# Patient Record
Sex: FEMALE | Race: WHITE | NOT HISPANIC OR LATINO | Employment: OTHER | URBAN - METROPOLITAN AREA
[De-identification: names, ages, dates, MRNs, and addresses within clinical notes are randomized per-mention and may not be internally consistent; named-entity substitution may affect disease eponyms.]

---

## 2024-03-19 ENCOUNTER — TELEPHONE (OUTPATIENT)
Dept: PODIATRY | Facility: CLINIC | Age: 74
End: 2024-03-19
Payer: MEDICARE

## 2024-03-19 NOTE — TELEPHONE ENCOUNTER
I spoke to the patient after receiving a referral from Dr.Irene Elliott patient said that she will call back tomorrow to schedule 03/19/2024 [facility] MEDINA.

## 2024-04-04 ENCOUNTER — TELEPHONE (OUTPATIENT)
Dept: PODIATRY | Facility: CLINIC | Age: 74
End: 2024-04-04
Payer: MEDICARE

## 2024-04-04 NOTE — TELEPHONE ENCOUNTER
----- Message from Lydia Witt sent at 4/4/2024 10:41 AM CDT -----  Contact: PT  Type:  Patient Returning Call    Who Called:  PT  Who Left Message for Patient:  Melody  Does the patient know what this is regarding?:  yes, schedule appt for infected ingrown toe nail  Best Call Back Number:  [phone]  Additional Information:    Pt asking  to be seen today or tomorrow

## 2024-04-05 ENCOUNTER — TELEPHONE (OUTPATIENT)
Dept: PODIATRY | Facility: CLINIC | Age: 74
End: 2024-04-05
Payer: MEDICARE

## 2024-04-05 NOTE — TELEPHONE ENCOUNTER
----- Message from Evgeny Wilson sent at 4/5/2024  2:40 PM CDT -----  Regarding: appt  Type:  Sooner Apoointment Request      Name of Caller:pt    When is the first available appointment?dept booked     Symptoms:ingrown toe nail/ infected       Best Call Back Number:[phone]      Additional Information: pt wants to get her appt back that was schedule for Monday.  please call to discuss.

## 2024-04-08 ENCOUNTER — OFFICE VISIT (OUTPATIENT)
Dept: PODIATRY | Facility: CLINIC | Age: 74
End: 2024-04-08
Payer: MEDICARE

## 2024-04-08 VITALS
HEART RATE: 75 BPM | RESPIRATION RATE: 18 BRPM | DIASTOLIC BLOOD PRESSURE: 52 MMHG | SYSTOLIC BLOOD PRESSURE: 140 MMHG | HEIGHT: 68 IN | WEIGHT: 185 LBS | BODY MASS INDEX: 28.04 KG/M2

## 2024-04-08 DIAGNOSIS — L53.9 ERYTHEMA OF SKIN: ICD-10-CM

## 2024-04-08 DIAGNOSIS — L60.1 ONYCHOLYSIS OF TOENAIL: ICD-10-CM

## 2024-04-08 DIAGNOSIS — S99.921S INJURY OF TOENAIL OF RIGHT FOOT, SEQUELA: Primary | ICD-10-CM

## 2024-04-08 DIAGNOSIS — B35.1 ONYCHOMYCOSIS OF TOENAIL: ICD-10-CM

## 2024-04-08 PROCEDURE — 3008F BODY MASS INDEX DOCD: CPT | Mod: CPTII,S$GLB,, | Performed by: PODIATRIST

## 2024-04-08 PROCEDURE — 4010F ACE/ARB THERAPY RXD/TAKEN: CPT | Mod: CPTII,S$GLB,, | Performed by: PODIATRIST

## 2024-04-08 PROCEDURE — 1160F RVW MEDS BY RX/DR IN RCRD: CPT | Mod: CPTII,S$GLB,, | Performed by: PODIATRIST

## 2024-04-08 PROCEDURE — 3288F FALL RISK ASSESSMENT DOCD: CPT | Mod: CPTII,S$GLB,, | Performed by: PODIATRIST

## 2024-04-08 PROCEDURE — 1100F PTFALLS ASSESS-DOCD GE2>/YR: CPT | Mod: CPTII,S$GLB,, | Performed by: PODIATRIST

## 2024-04-08 PROCEDURE — 3077F SYST BP >= 140 MM HG: CPT | Mod: CPTII,S$GLB,, | Performed by: PODIATRIST

## 2024-04-08 PROCEDURE — 3078F DIAST BP <80 MM HG: CPT | Mod: CPTII,S$GLB,, | Performed by: PODIATRIST

## 2024-04-08 PROCEDURE — 99999 PR PBB SHADOW E&M-EST. PATIENT-LVL IV: CPT | Mod: PBBFAC,,, | Performed by: PODIATRIST

## 2024-04-08 PROCEDURE — 1159F MED LIST DOCD IN RCRD: CPT | Mod: CPTII,S$GLB,, | Performed by: PODIATRIST

## 2024-04-08 PROCEDURE — 1126F AMNT PAIN NOTED NONE PRSNT: CPT | Mod: CPTII,S$GLB,, | Performed by: PODIATRIST

## 2024-04-08 PROCEDURE — 99202 OFFICE O/P NEW SF 15 MIN: CPT | Mod: S$GLB,,, | Performed by: PODIATRIST

## 2024-04-08 RX ORDER — LISINOPRIL AND HYDROCHLOROTHIAZIDE 20; 25 MG/1; MG/1
1 TABLET ORAL
COMMUNITY
Start: 2024-02-24

## 2024-04-08 RX ORDER — CIPROFLOXACIN 500 MG/1
500 TABLET ORAL EVERY 12 HOURS
Qty: 14 TABLET | Refills: 0 | Status: SHIPPED | OUTPATIENT
Start: 2024-04-08 | End: 2024-04-15

## 2024-04-08 RX ORDER — SIMVASTATIN 5 MG/1
5 TABLET, FILM COATED ORAL NIGHTLY
COMMUNITY
Start: 2024-01-24

## 2024-04-08 RX ORDER — SIMVASTATIN 10 MG/1
10 TABLET, FILM COATED ORAL
COMMUNITY
Start: 2023-09-20 | End: 2024-04-08

## 2024-04-08 RX ORDER — TRAZODONE HYDROCHLORIDE 50 MG/1
50 TABLET ORAL NIGHTLY PRN
COMMUNITY
Start: 2024-03-20 | End: 2024-04-08

## 2024-04-08 RX ORDER — PANTOPRAZOLE SODIUM 40 MG/1
40 TABLET, DELAYED RELEASE ORAL
COMMUNITY
Start: 2024-02-26

## 2024-04-08 RX ORDER — DOXYCYCLINE 100 MG/1
100 CAPSULE ORAL 2 TIMES DAILY
COMMUNITY
Start: 2024-03-14 | End: 2024-04-08

## 2024-04-08 RX ORDER — LEVOTHYROXINE SODIUM 25 UG/1
TABLET ORAL
COMMUNITY
Start: 2023-09-10

## 2024-04-08 RX ORDER — MONTELUKAST SODIUM 4 MG/1
2 TABLET, CHEWABLE ORAL 2 TIMES DAILY
COMMUNITY
Start: 2024-01-17 | End: 2024-04-08

## 2024-04-08 RX ORDER — TRAMADOL HYDROCHLORIDE 50 MG/1
50 TABLET ORAL
COMMUNITY
Start: 2023-09-20

## 2024-04-08 RX ORDER — LEVOTHYROXINE SODIUM 25 UG/1
25 TABLET ORAL
COMMUNITY
Start: 2024-02-01 | End: 2024-04-08 | Stop reason: SDUPTHER

## 2024-04-08 NOTE — PROGRESS NOTES
Subjective:       Patient ID: Laura Kothari is a 73 y.o. female.    Chief Complaint: Toe Injury and Nail Problem  Patient presents with her  with complaint nail problem right great toe following an injury in the fall 2023.  Relates as nails started to come off it became painful, red, swollen.  She has been soaking, applying a Band-Aid, has been improved since she has been wearing open toed shoes exclusively, denies drainage.  PCP did put her on doxycycline, patient relates no change in redness or swelling of the toe after completing this antibiotic.  Today she relates it is still red and swollen, no drainage, no pain.  Patient inquires about treatment for thick nail left great toe which she has been using a $60 dollar topical on with no improvement    Past Medical History:   Diagnosis Date    Hyperlipidemia     Hypertension     Thyroid disease      Past Surgical History:   Procedure Laterality Date    BREAST SURGERY      CHOLECYSTECTOMY      FOOT SURGERY      Bone Right Foot    HYSTERECTOMY      TONSILLECTOMY      TUBAL LIGATION       Family History   Problem Relation Age of Onset    Heart disease Mother     Heart disease Father      Social History     Socioeconomic History    Marital status:    Tobacco Use    Smoking status: Never    Smokeless tobacco: Never   Substance and Sexual Activity    Alcohol use: Not Currently    Drug use: Not Currently    Sexual activity: Not Currently       Current Outpatient Medications   Medication Sig Dispense Refill    levothyroxine (SYNTHROID) 25 MCG tablet = 1 tab, Oral, Daily, # 90 tab, 3 Refill(s), Maintenance, Pharmacy: [facility], 173, cm, 07/26/23 9:05:00 CDT, Height/Length Measured, 85.7, kg, 07/26/23 9:05:00 CDT, Weight Dosing      lisinopriL-hydrochlorothiazide (PRINZIDE,ZESTORETIC) 20-25 mg Tab Take 1 tablet by mouth.      pantoprazole (PROTONIX) 40 MG tablet Take 40 mg by mouth.      simvastatin (ZOCOR) 5 MG tablet Take 5 mg by mouth  "every evening.      traMADoL (ULTRAM) 50 mg tablet 50 mg.      ciprofloxacin HCl (CIPRO) 500 MG tablet Take 1 tablet (500 mg total) by mouth every 12 (twelve) hours. for 7 days 14 tablet 0     No current facility-administered medications for this visit.     Review of patient's allergies indicates:   Allergen Reactions    Clarithromycin Nausea Only    Codeine      Other Reaction(s): antsy, nausea    Morphine      Other Reaction(s): nausea, "antsy"    Sulfa (sulfonamide antibiotics) Nausea Only       Review of Systems   Cardiovascular:  Negative for leg swelling.   Musculoskeletal:  Negative for gait problem.   All other systems reviewed and are negative.      Objective:      Vitals:    04/08/24 0833   BP: (!) 140/52   Pulse: 75   Resp: 18   Weight: 83.9 kg (185 lb)   Height: 5' 8" (1.727 m)     Physical Exam  Vitals and nursing note reviewed. Exam conducted with a chaperone present.   Constitutional:       General: She is not in acute distress.     Appearance: Normal appearance.   Cardiovascular:      Pulses:           Dorsalis pedis pulses are 2+ on the right side and 2+ on the left side.        Posterior tibial pulses are 1+ on the right side and 1+ on the left side.   Pulmonary:      Effort: Pulmonary effort is normal.   Feet:      Right foot:      Skin integrity: Erythema (Erythematous hallux, almost complete onycholysis of dystrophic fungal right hallux nail, attached at the base where there is pain, no calor, no drainage, pink smooth dry nail bed) present.      Toenail Condition: Right toenails are abnormally thick. Fungal disease present.     Left foot:      Skin integrity: No erythema (Thick raised onychomycosis left hallux nail and a few lesser digits bilateral).      Toenail Condition: Left toenails are abnormally thick.   Skin:     Capillary Refill: Capillary refill takes 2 to 3 seconds.      Findings: Erythema present.   Neurological:      General: No focal deficit present.      Mental Status: She is " alert.   Psychiatric:         Behavior: Behavior normal.         Thought Content: Thought content normal.            Assessment:       1. Injury of toenail of right foot, sequela    2. Onycholysis of toenail - Right Foot    3. Erythema of skin - Right Foot    4. Onychomycosis of toenail - Left Foot        Plan:         CIPRO 500 MG B.I.D. TIMES 7 DAYS      Instructed patient redness of the toe can be skin irritation due to moisture trapped under the nail since it is very loose  Instructed start soaking warm water and Epson salt once daily, do not apply any topical medication, no ointment, keep open an air out as much as possible even when sleeping  All detached nail was debrided to patient's tolerance, she does have pain at the lateral base, nail is still attached through the cuticle region  Showed patient how to use light wrap of Coban which has soft, breathable, apply only if absolutely necessary and leave uncovered majority of the time  If redness swelling does not resolve in 2-3 days after treating the nail today and using her daily soaking I would recommend she take the antibiotic which was sent in today, twice daily x7 days  Advised daily soaking is long term treatment and remaining portion of nail should become loose over time and fall off on its own.  If she has any problems follow-up may be needed to trim or debride remaining portion of the nail  Again overall she is to keep it clean and dry  Reduce thickness left hallux nail and had a lengthy discussion regarding topical treatments for fungal involvement of the nail and the need for her to reduce the thickness of the nail on a weekly basis for best results  Contact office with any changes  Patient was in understanding and agreement with treatment plan.  I counseled the patient on their conditions, implications and medical management.  Instructed patient/family to contact the office with any changes, questions, concerns, worsening of symptoms.   Total face  to face time 30 minutes, exam, assessment, treatment, discussion, additional time for review of chart prior to and following appointment and visit documentation, consultation and coordination of care.    Follow up as needed    This note was created using [employer] voice recognition software that occasionally misinterpreted phrases or words.